# Patient Record
(demographics unavailable — no encounter records)

---

## 2024-10-10 NOTE — PHYSICAL EXAM
[Alert] : alert [Conjunctivae with no discharge] : conjunctivae with no discharge [Clear Tympanic membranes with present light reflex and bony landmarks] : clear tympanic membranes with present light reflex and bony landmarks [Nonerythematous Oropharynx] : nonerythematous oropharynx [Supple, full passive range of motion] : supple, full passive range of motion [Clear to Auscultation Bilaterally] : clear to auscultation bilaterally [Regular Rate and Rhythm] : regular rate and rhythm [Normal S1, S2 present] : normal S1, S2 present [No Murmurs] : no murmurs [Soft] : soft [Aram: ____] : Aram [unfilled] [Aram: _____] : Aram [unfilled] [Straight] : straight

## 2024-10-10 NOTE — HISTORY OF PRESENT ILLNESS
[Mother] : mother [Eats healthy meals and snacks] : eats healthy meals and snacks [Normal] : Normal [Yes] : Patient goes to dentist yearly [Premenarche] : premenarche [Playtime (60 min/d)] : playtime 60 min a day [Grade ___] : Grade [unfilled] [de-identified] : dance [de-identified] : Tdap, Flu [NO] : No [FreeTextEntry1] : 10 y/o F here for initial well visit at our practice.

## 2024-10-10 NOTE — HISTORY OF PRESENT ILLNESS
[Mother] : mother [Eats healthy meals and snacks] : eats healthy meals and snacks [Normal] : Normal [Yes] : Patient goes to dentist yearly [Premenarche] : premenarche [Playtime (60 min/d)] : playtime 60 min a day [Grade ___] : Grade [unfilled] [de-identified] : dance [de-identified] : Tdap, Flu [NO] : No [FreeTextEntry1] : 10 y/o F here for initial well visit at our practice.

## 2025-06-06 NOTE — CARDIOLOGY SUMMARY
[de-identified] : 06/05/2025  [FreeTextEntry1] : Normal sinus rhythm without preexcitation or ectopy. Heart rate (bpm): 78 [de-identified] : 06/05/2025  [FreeTextEntry2] : 1. Trivial pulmonary valve regurgitation. 2. Normal left ventricular size, morphology and systolic function. 3. Normal right ventricular morphology with qualitatively normal size and systolic function. 4. No pericardial effusion.

## 2025-06-06 NOTE — CONSULT LETTER
[FreeTextEntry4] : MONICA BENITO MD [FreeTextEntry5] : [facility]  [FreeTextEntry6] [facility]  [de-identified] : Yissel Culp MD, FAAP, FACC  Pediatric Cardiologist  of Pediatrics [facility]

## 2025-06-06 NOTE — CONSULT LETTER
[FreeTextEntry4] : MONICA BENITO MD [FreeTextEntry5] : [facility]  [FreeTextEntry6] : [facility]  [de-identified] : Yissel Culp MD, FAAP, FACC  Pediatric Cardiologist  of Pediatrics [facility]

## 2025-06-06 NOTE — CONSULT LETTER
[FreeTextEntry4] : MONICA BENITO MD [FreeTextEntry5] : [facility]  [FreeTextEntry6] : [facility]  [de-identified] : Yissel Culp MD, FAAP, FACC  Pediatric Cardiologist  of Pediatrics [facility]

## 2025-06-06 NOTE — HISTORY OF PRESENT ILLNESS
[FreeTextEntry1] : SASHA is a 10 year female who presents for cardiac evaluation of a one-year history of intermittent left-sided chest pain. The pain is described as sharp and severe, rated 7/10 on a pain scale. Episodes occur approximately twice a week and last for a couple of minutes before resolving spontaneously. The patient reports associated symptoms of occasional fast heart rate, difficulty breathing during sports, dizziness, and one near-fainting episode at home. Pain can occur at rest or during physical activity. The patient uses over-the-counter medication provided by her mother for relief, along with sitting up straight and taking deep breaths. During a recent episode in music class, she experienced left-sided chest pain along with chest discomfort. The patient has sought medical attention, including a visit to the school nurse, and has been sent home on at least one occasion due to symptoms.

## 2025-06-06 NOTE — DISCUSSION/SUMMARY
[FreeTextEntry1] : SASHA has costochondritis and a normal cardiac exam, electrocardiogram and echocardiogram. She has trivial PI which estimates normal PA pressures and is within normal limits.  The chest pain described is musculoskeletal chest pain and is not related to a cardiac abnormality.  I reassured SASHA and her family that the SASHA's heart is structurally and functionally normal. She should take 300 mg of ibuprofen 3 times per day for the next 5 days for treatment of costochondritis. Warm compresses to the tender area may also be beneficial.  All physical activities may be performed without restriction and there is no need for routine follow-up unless future concerns arise.   [Needs SBE Prophylaxis] : [unfilled] does not need bacterial endocarditis prophylaxis [PE + No Restrictions] : [unfilled] may participate in the entire physical education program without restriction, including all varsity competitive sports.

## 2025-06-06 NOTE — HISTORY OF PRESENT ILLNESS
[FreeTextEntry1] : SAHSA is a 10 year female who presents for cardiac evaluation of a one-year history of intermittent left-sided chest pain. The pain is described as sharp and severe, rated 7/10 on a pain scale. Episodes occur approximately twice a week and last for a couple of minutes before resolving spontaneously. The patient reports associated symptoms of occasional fast heart rate, difficulty breathing during sports, dizziness, and one near-fainting episode at home. Pain can occur at rest or during physical activity. The patient uses over-the-counter medication provided by her mother for relief, along with sitting up straight and taking deep breaths. During a recent episode in music class, she experienced left-sided chest pain along with chest discomfort. The patient has sought medical attention, including a visit to the school nurse, and has been sent home on at least one occasion due to symptoms.

## 2025-06-06 NOTE — CARDIOLOGY SUMMARY
[de-identified] : 06/05/2025  [FreeTextEntry1] : Normal sinus rhythm without preexcitation or ectopy. Heart rate (bpm): 78 [de-identified] : 06/05/2025  [FreeTextEntry2] : 1. Trivial pulmonary valve regurgitation. 2. Normal left ventricular size, morphology and systolic function. 3. Normal right ventricular morphology with qualitatively normal size and systolic function. 4. No pericardial effusion.

## 2025-06-06 NOTE — CARDIOLOGY SUMMARY
[de-identified] : 06/05/2025  [FreeTextEntry1] : Normal sinus rhythm without preexcitation or ectopy. Heart rate (bpm): 78 [de-identified] : 06/05/2025  [FreeTextEntry2] : 1. Trivial pulmonary valve regurgitation. 2. Normal left ventricular size, morphology and systolic function. 3. Normal right ventricular morphology with qualitatively normal size and systolic function. 4. No pericardial effusion.

## 2025-06-06 NOTE — PHYSICAL EXAM
[General Appearance - Alert] : alert [General Appearance - In No Acute Distress] : in no acute distress [General Appearance - Well Nourished] : well nourished [General Appearance - Well Developed] : well developed [General Appearance - Well-Appearing] : well appearing [Appearance Of Head] : the head was normocephalic [Facies] : there were no dysmorphic facial features [Sclera] : the conjunctiva were normal [Outer Ear] : the ears and nose were normal in appearance [Examination Of The Oral Cavity] : mucous membranes were moist and pink [Auscultation Breath Sounds / Voice Sounds] : breath sounds clear to auscultation bilaterally [Normal Chest Appearance] : the chest was normal in appearance [Tenderness Costochondral Junction Right] : tenderness [Tenderness Costochondral Junction Left] : tenderness [Apical Impulse] : quiet precordium with normal apical impulse [Heart Rate And Rhythm] : normal heart rate and rhythm [Heart Sounds] : normal S1 and S2 [No Murmur] : no murmurs  [Heart Sounds Gallop] : no gallops [Heart Sounds Pericardial Friction Rub] : no pericardial rub [Edema] : no edema [Arterial Pulses] : normal upper and lower extremity pulses with no pulse delay [Heart Sounds Click] : no clicks [Capillary Refill Test] : normal capillary refill [Bowel Sounds] : normal bowel sounds [Abdomen Soft] : soft [Nondistended] : nondistended [Abdomen Tenderness] : non-tender [Nail Clubbing] : no clubbing  or cyanosis of the fingers [Motor Tone] : normal muscle strength and tone [Cervical Lymph Nodes Enlarged Anterior] : The anterior cervical nodes were normal [Cervical Lymph Nodes Enlarged Posterior] : The posterior cervical nodes were normal [] : no rash [Skin Lesions] : no lesions [Skin Turgor] : normal turgor [Demonstrated Behavior - Infant Nonreactive To Parents] : interactive [Mood] : mood and affect were appropriate for age [Demonstrated Behavior] : normal behavior

## 2025-06-06 NOTE — DISCUSSION/SUMMARY
[FreeTextEntry1] : SSAHA has costochondritis and a normal cardiac exam, electrocardiogram and echocardiogram. She has trivial PI which estimates normal PA pressures and is within normal limits.  The chest pain described is musculoskeletal chest pain and is not related to a cardiac abnormality.  I reassured SASHA and her family that the SASHA's heart is structurally and functionally normal. She should take 300 mg of ibuprofen 3 times per day for the next 5 days for treatment of costochondritis. Warm compresses to the tender area may also be beneficial.  All physical activities may be performed without restriction and there is no need for routine follow-up unless future concerns arise.   [Needs SBE Prophylaxis] : [unfilled] does not need bacterial endocarditis prophylaxis [PE + No Restrictions] : [unfilled] may participate in the entire physical education program without restriction, including all varsity competitive sports.